# Patient Record
Sex: MALE | Race: BLACK OR AFRICAN AMERICAN | NOT HISPANIC OR LATINO | URBAN - METROPOLITAN AREA
[De-identification: names, ages, dates, MRNs, and addresses within clinical notes are randomized per-mention and may not be internally consistent; named-entity substitution may affect disease eponyms.]

---
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
## 2025-01-10 ENCOUNTER — RX RENEWAL (OUTPATIENT)
Age: 20
End: 2025-01-10

## 2025-01-15 ENCOUNTER — NON-APPOINTMENT (OUTPATIENT)
Age: 20
End: 2025-01-15

## 2025-01-23 NOTE — ASU DISCHARGE PLAN (ADULT/PEDIATRIC) - PROCEDURE
sedated MRI of the brain Recommendations (Free Text): HC1% cream Render Risk Assessment In Note?: no Detail Level: Zone Recommendation Preamble: The following recommendations were made during the visit:

## 2025-02-03 ENCOUNTER — RX RENEWAL (OUTPATIENT)
Age: 20
End: 2025-02-03

## 2025-02-06 ENCOUNTER — APPOINTMENT (OUTPATIENT)
Dept: PEDIATRIC NEUROLOGY | Facility: CLINIC | Age: 20
End: 2025-02-06
Payer: MEDICAID

## 2025-02-06 VITALS — HEIGHT: 71 IN | BODY MASS INDEX: 16.8 KG/M2 | WEIGHT: 120 LBS

## 2025-02-06 DIAGNOSIS — G40.909 EPILEPSY, UNSPECIFIED, NOT INTRACTABLE, W/OUT STATUS EPILEPTICUS: ICD-10-CM

## 2025-02-06 PROCEDURE — 99214 OFFICE O/P EST MOD 30 MIN: CPT

## 2025-02-06 PROCEDURE — G2211 COMPLEX E/M VISIT ADD ON: CPT | Mod: NC

## 2025-02-07 LAB — 24R-OH-CALCIDIOL SERPL-MCNC: 82.5 PG/ML

## 2025-02-11 LAB — LEVETIRACETAM SERPL-MCNC: 58.1 UG/ML

## 2025-02-12 ENCOUNTER — NON-APPOINTMENT (OUTPATIENT)
Age: 20
End: 2025-02-12

## 2025-02-12 LAB
25(OH)D3 SERPL-MCNC: 28.4 NG/ML
LACOSAMIDE (VIMPAT): 14.3 UG/ML

## 2025-03-03 ENCOUNTER — RX RENEWAL (OUTPATIENT)
Age: 20
End: 2025-03-03

## 2025-03-03 NOTE — ED PROVIDER NOTE - NECK, MLM
Caller: patient    Doctor: Dr. Edgar    Reason for call: patient called back returning a call from N. Patient was warm transferred    Call back#: n/a   normal

## 2025-06-12 NOTE — ED PROVIDER NOTE - CROS ED ENMT ALL NEG
[facility] Medicine Services  DISCHARGE SUMMARY    Patient Name: Barbara Phillips  : 1975  MRN: [id number]    Date of Admission: 2025  7:09 AM  Date of Discharge:  2025  Primary Care Physician: Matilda Almendarez MD    Consults       Date and Time Order Name Status Description    6/10/2025  8:46 AM Inpatient Neurology Consult General Completed             Hospital Course     Presenting Problem:     Active Hospital Problems    Diagnosis  POA    **Intractable vomiting with nausea [R11.2]  Yes    Hypertensive emergency [I16.1]  Yes    HTN (hypertension) [I10]  Yes    Rheumatoid arthritis [M06.9]  Yes    Hypertensive crisis [I16.9]  Yes      Resolved Hospital Problems   No resolved problems to display.          Hospital Course:  Barbara Phillips is a 49 y.o. female  w/ HTN, hypothyroid, rheumatoid/psoriatic arthritis, morbid obesity, holding her MTX for planned TKA and developed visual hallucinations ~4 days pta, then the night before arrival developed intractable N/V followed by a headache and was found to have BP of 237/139.     HTN Emergency  Abnl MRI likely c/w subacute CVA  Headache  Visual hallucinations  - d/w neurology - feels mri finding more c/w cva given risk factors.   - increased diovan.  BP still up, increased amlodipine to 10mg daily.  Home if SBP less than 170 and echo is normal  - Asa/statin  - s/p LP.  Neuro to f/u MS profile and oligoclonal bands     Mesenteric Panniculitis  -- noted on CT A/P, however, no other s/s of active infection. Continue to monitor off ABX for now      Hypothyroidism   - cont home levothyroxine     RA (reports rheumatoid/psoriatic arthritis)   - typically takes MTX on , last dose 25 in plan for surgery     Morbid Obesity  -- complicates all aspects of care      Discharge Follow Up Recommendations for outpatient labs/diagnostics:  F/u with PCP in 1 week  F/u with neuro, MINDI Luis in 1-2 mos, f/u MS profile and  oligoclonal bands    Day of Discharge     HPI:   Saw patient.  Says symptoms have resolved.  Wants to go home.     Review of Systems  Gen- No fevers, chills  CV- No chest pain, palpitations  Resp- No cough, dyspnea  GI- No N/V/D, abd pain      Vital Signs:   Temp:  [98 °F (36.7 °C)-99.2 °F (37.3 °C)] 98.8 °F (37.1 °C)  Heart Rate:  [] 99  Resp:  [16-19] 18  BP: (147-181)/() 174/91  Flow (L/min) (Oxygen Therapy):  [2-3] 3      Physical Exam:  Constitutional: No acute distress, awake, alert, sitting up in chair  HENT: NCAT, mucous membranes moist  Respiratory: Clear to auscultation bilaterally, respiratory effort normal   Cardiovascular: RRR, no murmurs, rubs, or gallops  Gastrointestinal: Positive bowel sounds, soft, nontender, nondistended  Musculoskeletal: No bilateral ankle edema  Psychiatric: Appropriate affect, cooperative  Neurologic: Oriented x 3, [place], speech clear  Skin: No rashes      Pertinent  and/or Most Recent Results     LAB RESULTS:      Lab 06/11/25  0445 06/10/25  0447 06/09/25  1044 06/09/25  0917 06/09/25  0747   WBC 8.75 9.47  --   --  10.40   HEMOGLOBIN 11.9* 12.3  --   --  14.2   HEMATOCRIT 36.2 37.8  --   --  41.1   PLATELETS 429 403  --   --  497*   NEUTROS ABS 4.09 5.00  --   --  7.21*   IMMATURE GRANS (ABS) 0.03 0.02  --   --  0.03   LYMPHS ABS 2.99 2.88  --   --  2.13   MONOS ABS 0.81 0.93*  --   --  0.71   EOS ABS 0.76* 0.57*  --   --  0.27   MCV 91.0 91.5  --   --  85.6   SED RATE 26*  --   --   --  53*   CRP  --   --   --  1.50*  --    PROCALCITONIN  --   --   --   --  0.08   LACTATE  --   --  1.8  --  3.2*         Lab 06/11/25  1533 06/11/25  0445 06/10/25  1157 06/09/25  0747   SODIUM  --  140 142 141   POTASSIUM 4.1 3.6 3.8 4.5   CHLORIDE  --  107 107 103   CO2  --  22.0 23.0 18.0*   ANION GAP  --  11.0 12.0 20.0*   BUN  --  5.7* 6.2 13.6   CREATININE  --  0.65 0.69 0.97   EGFR  --  108.1 106.5 71.8   GLUCOSE  --  124* 116* 153*   CALCIUM  --  8.0* 8.3* 10.0   IONIZED  CALCIUM  --   --   --  1.11*   MAGNESIUM  --   --  1.7 1.4*   HEMOGLOBIN A1C  --  6.30*  --   --    TSH  --  10.810*  --  9.890*         Lab 06/09/25  0747   TOTAL PROTEIN 8.5   ALBUMIN 4.7   GLOBULIN 3.8   ALT (SGPT) 11   AST (SGOT) 23   BILIRUBIN 0.4   ALK PHOS 112   LIPASE 13         Lab 06/09/25  0917 06/09/25  0747   PROBNP  --  1,104.0*   HSTROP T 25* 29*         Lab 06/11/25  0445   CHOLESTEROL 276*   LDL CHOL 189*   HDL CHOL 32*   TRIGLYCERIDES 279*             Brief Urine Lab Results  (Last result in the past 365 days)        Color   Clarity   Blood   Leuk Est   Nitrite   Protein   CREAT   Urine HCG        06/09/25 0738               Negative       06/09/25 0738 Yellow   Clear   Negative   Negative   Negative   30 mg/dL (1+)                 Microbiology Results (last 10 days)       Procedure Component Value - Date/Time    Meningitis / Encephalitis Panel, PCR - Cerebrospinal Fluid, Lumbar Puncture [[id number]]  (Normal) Collected: 06/11/25 1440    Lab Status: Final result Specimen: Cerebrospinal Fluid from Lumbar Puncture Updated: 06/11/25 1653     ESCHERICHIA COLI K1, PCR Not Detected     HAEMOPHILUS INFLUENZAE, PCR Not Detected     LISTERIA MONOCYTOGENES, PCR Not Detected     NEISSERIA MENINGITIDIS, PCR Not Detected     STREPTOCOCCUS AGALACTIAE, PCR Not Detected     STREPTOCOCCUS PNEUMONIAE, PCR Not Detected     CYTOMEGALOVIRUS (CMV), PCR Not Detected     ENTEROVIRUS, PCR Not Detected     HERPES SIMPLEX VIRUS 1 (HSV-1), PCR Not Detected     HERPES SIMPLEX VIRUS 2 (HSV-2), PCR Not Detected     HUMAN PARECHOVIRUS, PCR Not Detected     VARICELLA ZOSTER VIRUS (VZV), PCR Not Detected     CRYPTOCOCCUS NEOFORMANS / GATTII, PCR Not Detected     HUMAN HERPES VIRUS 6 PCR Not Detected    Blood Culture - Blood, Arm, Right [[id number]]  (Normal) Collected: 06/09/25 0917    Lab Status: Preliminary result Specimen: Blood from Arm, Right Updated: 06/12/25 1000     Blood Culture No growth at 3 days    Narrative:      Less  than seven (7) mL's of blood was collected.  Insufficient quantity may yield false negative results.    Blood Culture - Blood, Hand, Left [[id number]]  (Normal) Collected: 06/09/25 0900    Lab Status: Preliminary result Specimen: Blood from Hand, Left Updated: 06/12/25 1015     Blood Culture No growth at 3 days    Narrative:      Aerobic Bottle Only    Less than seven (7) mL's of blood was collected.  Insufficient quantity may yield false negative results.            CT Angiogram Head  Result Date: 6/11/2025  CT ANGIOGRAM HEAD, CT ANGIOGRAM NECK Date of Exam: 6/11/2025 3:03 PM EDT Indication: stroke. Comparison: Brain MRI from today Technique: CTA of the head was performed after the uneventful intravenous administration of 75 mL Isovue-370. Reconstructed coronal and sagittal images were also obtained. In addition, a 3-D volume rendered image was created for interpretation. Automated  exposure control and iterative reconstruction methods were used. FINDINGS: Vascular Findings: The right common carotid, internal carotid, middle cerebral, anterior cerebral, vertebral, and posterior cerebral arteries are patent without abrupt cut off or aneurysmal dilation. The left common carotid, internal carotid, middle cerebral, anterior cerebral, vertebral, and posterior cerebral arteries are patent without abrupt cut off or aneurysmal dilation. Basilar artery appears patent and appears unremarkable. Non-vascular Findings: For description of nonvascular intracranial findings, please refer to the brain MRI performed the same date. No acute abnormality is identified within the visualized soft tissue or bony structures of the neck. The visualized lung apices are clear.     1.No acute abnormality is identified within the large arteries of the head or neck. 2.No significant stenosis of the bilateral internal carotid arteries. Electronically Signed: Dilshad North MD  6/11/2025 4:30 PM EDT  Workstation ID: [id number]    CT Angiogram  Neck  Result Date: 6/11/2025  CT ANGIOGRAM HEAD, CT ANGIOGRAM NECK Date of Exam: 6/11/2025 3:03 PM EDT Indication: stroke. Comparison: Brain MRI from today Technique: CTA of the head was performed after the uneventful intravenous administration of 75 mL Isovue-370. Reconstructed coronal and sagittal images were also obtained. In addition, a 3-D volume rendered image was created for interpretation. Automated  exposure control and iterative reconstruction methods were used. FINDINGS: Vascular Findings: The right common carotid, internal carotid, middle cerebral, anterior cerebral, vertebral, and posterior cerebral arteries are patent without abrupt cut off or aneurysmal dilation. The left common carotid, internal carotid, middle cerebral, anterior cerebral, vertebral, and posterior cerebral arteries are patent without abrupt cut off or aneurysmal dilation. Basilar artery appears patent and appears unremarkable. Non-vascular Findings: For description of nonvascular intracranial findings, please refer to the brain MRI performed the same date. No acute abnormality is identified within the visualized soft tissue or bony structures of the neck. The visualized lung apices are clear.     1.No acute abnormality is identified within the large arteries of the head or neck. 2.No significant stenosis of the bilateral internal carotid arteries. Electronically Signed: Dilshad North MD  6/11/2025 4:30 PM EDT  Workstation ID: [id number]    IR LUMBAR PUNCTURE DIAGNOSTIC  Result Date: 6/11/2025  IR LUMBAR PUNCTURE DIAGNOSTIC Date of Exam: 6/11/2025 1:30 PM EDT Indication: MS workup.   Comparison: CT abdomen pelvis 6/9/2025. Technique:  Procedure, risks, complications, and side effects of lumbar puncture were discussed and reviewed in detail with the patient including the possibility for bleeding, infection, needle damage to surrounding structures, and the potential for a spinal headache. The patient indicated understanding and  consented to the procedure.  The patient was placed in the prone position on the table. The back was prepped and draped in a sterile fashion. 1% lidocaine was used as local anesthetic to the skin and subcutaneous tissues. Under fluoroscopic guidance a 22 g spinal needle was advanced  at the L4-L5 level to the CSF space. Initial sample was blood tinged, though subsequent fluid became clear and colorless. Approximately 6 cc of CSF was returned and collected in each of 4 numbered vials. Sample vials were labeled and sent to pathology for further analysis. The needle was removed. Fluoroscopic Time: 0.2 minutes Number of Images: 1 Findings/    Impression: Technically successful fluoroscopic-guided lumbar puncture, as above. Electronically Signed: Deep Tiwari MD  6/11/2025 3:59 PM EDT  Workstation ID: [id number]    MRI Cervical Spine With & Without Contrast  Result Date: 6/11/2025  MRI CERVICAL SPINE W WO CONTRAST Date of Exam: 6/11/2025 12:03 AM EDT Indication: MS eval.  Comparison: None available. Technique:  Routine multiplanar/multisequence sequence images of the cervical spine were obtained before and after the uneventful administration of 20 mL Multihance.  Findings: Seven cervical vertebrae are identified. Alignment is anatomic. Disc spaces maintain normal height. Vertebral bodies maintain normal height.  There is no focal bone marrow edema. There is no evidence of a marrow replacing process. No acute osseous abnormalities. Spinal cord signal is normal. Paraspinal musculature is grossly preserved. There is no evidence of cervical lymphadenopathy or a neck mass. The craniocervical junction appears within normal limits. Limited view of the posterior fossa contents is unremarkable. No abnormal enhancement. C2-C3: The posterior disc is unremarkable. The uncovertebral joints appear within normal limits. Mild left facet hypertrophy. No significant neural foraminal or spinal canal stenosis. C3-C4: The posterior disc is  negative - no nasal congestion unremarkable. The uncovertebral joints appear within normal limits. Mild left facet hypertrophy. No significant neural foraminal or spinal canal stenosis. C4-C5: The posterior disc is unremarkable. The uncovertebral joints appear within normal limits. No significant facet arthropathy. No significant neural foraminal or spinal canal stenosis. C5-C6: The posterior disc is unremarkable. The uncovertebral joints appear within normal limits. No significant facet arthropathy. No significant neural foraminal or spinal canal stenosis. C6-C7: The posterior disc is unremarkable. The uncovertebral joints appear within normal limits. No significant facet arthropathy. No significant neural foraminal or spinal canal stenosis. C7-T1: The posterior disc is unremarkable. No significant facet arthropathy. No significant neural foraminal or spinal canal stenosis.     Impression: 1.No evidence of demyelinating disease in the cervical spine. 2.Mild left-sided facet arthritis. Electronically Signed: Tutu Phillips MD  6/11/2025 3:38 AM EDT  Workstation ID: [id number]    MRI Brain With & Without Contrast  Result Date: 6/11/2025  MRI BRAIN W WO CONTRAST Date of Exam: 6/11/2025 12:03 AM EDT Indication: follow up contrasted portion of possible demyelination seen on noncontrasted MRI. MS protocol with saggital FLAIR.  Comparison: None available. Technique:  Routine multiplanar/multisequence sequence images of the brain were obtained before and after the uneventful administration of 20 mL Multihance. Findings: There is a small ovoid focus of relative diffusion restriction again noted in the left frontoparietal white matter. It measures approximately 8 mm in diameter. There is subtle enhancement. This may relate to a focus of demyelination or subacute lacunar infarct. There are numerous scattered FLAIR hyperintensities within the cerebral white matter. This is predominantly periventricular, but there are numerous subcortical foci, as well. There  is no evidence of an acute infarct. The pattern of white matter disease could be from demyelinating disease although chronic small vessel ischemic change is certainly possible. Cerebral cortex appears intact. There is a small focus of FLAIR hyperintense signal within the left putamen. Cerebellum and brainstem appear unremarkable. Major arterial flow voids appear intact. There is severe left and mild right maxillary sinus mucosal disease. Mastoid air cells appear well aerated. Orbits are symmetric. Optic nerves are normal. The midline structures appear intact. Calvarial and superficial soft tissue signal is within normal limits.     Impression: 1.Small focus of relative diffusion restriction in the left frontoparietal white matter with subtle enhancement. This could relate to a focus of demyelination or subacute lacunar infarct. 2.Numerous scattered FLAIR hyperintensities in the cerebral white matter. This could be from demyelinating disease or chronic small vessel ischemic change. 3.Severe left and mild right maxillary sinus mucosal disease. Electronically Signed: Tutu Phillips MD  6/11/2025 3:21 AM EDT  Workstation ID: [id number]    MRI Brain Without Contrast  Result Date: 6/9/2025  MRI BRAIN WO CONTRAST Date of Exam: 6/9/2025 8:50 PM EDT Indication: Visual hallucinations, SBP >200 mmHg, vomiting, r/o PRES.  Comparison: CT of the head performed on the same date. Technique:  Routine multiplanar/multisequence sequence images of the brain were obtained without contrast administration. Findings: No area of restricted diffusion is identified to suggest an acute intracranial infarct. Multifocal areas of nonspecific increased T2/FLAIR signal predominantly within the periventricular white matter. The ventricles and sulci are normal in size  and configuration. No intracranial mass or mass effect. No extra-axial mass or collection. The posterior fossa is normal. Sellar and suprasellar structures are normal. The major  intracranial flow-voids of the [place] are patent. Orbital and periorbital soft tissues are normal. Severe mucosal thickening of the left maxillary sinus. Mucoid retention cysts or polyps within the right maxillary sinus.     Impression: White matter changes likely demyelinating disease with differential considerations including sequela prior inflammation or infection, vasculitis, or chronic small vessel/microangiopathic ischemic changes Electronically Signed: Kyle Hurt MD  6/9/2025 9:20 PM EDT  Workstation ID: [id number]    CT Angiogram Chest  Result Date: 6/9/2025  CT ANGIOGRAM CHEST, CT ABDOMEN PELVIS W CONTRAST Date of Exam: 6/9/2025 11:00 AM EDT Indication: Hallucinations, tachycardia, ill. Comparison: None available. Technique: CTA of the chest was performed after the uneventful intravenous administration of 85 mL Isovue-370. Reconstructed coronal and sagittal images were also obtained. In addition, a 3-D volume rendered image was created for interpretation. Automated exposure control and iterative reconstruction methods were used. FINDINGS: Thoracic inlet: Unremarkable. Pulmonary arteries: Suboptimal contrast bolus timing limit evaluation of the small peripheral pulmonary arteries. No large central pulmonary embolism is seen. Great vessels: The thoracic aorta and proximal arch vessels appear unremarkable. Mediastinum/Ivy: No pathologically enlarged mediastinal lymph nodes are seen. The esophagus is unremarkable. Lung parenchyma: The lungs appear adequately aerated. No acute consolidation is seen. No suspicious pulmonary nodules are seen. Trachea and airways: The trachea and central airways appear unremarkable. Pleural space: No significant pleural effusion or pneumothorax is seen. Heart and pericardium: The heart and pericardium appear unremarkable. Liver: The liver is prominent in size and decreased in attenuation. No focal liver lesion is seen. No biliary dilation is seen. Gallbladder: Surgically  absent. Pancreas: Unremarkable. Spleen: Unremarkable. Adrenal glands: Unremarkable. Genitourinary tract: Peripelvic cysts noted within the left kidney. Kidneys are otherwise unremarkable. No hydronephrosis is seen. The visualized portions of the ureters and urinary bladder appear unremarkable. 5 cm mass associated with the right uterus,  likely a fibroid. Gastrointestinal tract: A few scattered colonic diverticula are seen. The hollow viscera appear otherwise unremarkable. There is no evidence of bowel obstruction. Appendix: The appendix is not identified. Other findings: Stranding within the small bowel mesenteric fat, nonspecific, possibly related to mesenteric panniculitis. No free air or free fluid is identified. No pathologically enlarged lymph nodes are seen. The abdominal aorta and IVC appear unremarkable. Bones and soft tissues: No acute or suspicious osseous or soft tissue lesion is identified.     1.Suboptimal contrast bolus timing limits evaluation of the small peripheral pulmonary arteries. No large central pulmonary embolism is seen. 2.No acute abnormality is identified within the chest, abdomen, or pelvis. 3.Enlarged, fatty liver. 4.Stranding within the small bowel mesenteric fat, nonspecific, possibly related to mesenteric panniculitis. 5.Probable 5 cm fibroid within the right uterus. 6.Additional findings as detailed above. Electronically Signed: Dilshad North MD  6/9/2025 11:36 AM EDT  Workstation ID: [id number]    CT Abdomen Pelvis With Contrast  Result Date: 6/9/2025  CT ANGIOGRAM CHEST, CT ABDOMEN PELVIS W CONTRAST Date of Exam: 6/9/2025 11:00 AM EDT Indication: Hallucinations, tachycardia, ill. Comparison: None available. Technique: CTA of the chest was performed after the uneventful intravenous administration of 85 mL Isovue-370. Reconstructed coronal and sagittal images were also obtained. In addition, a 3-D volume rendered image was created for interpretation. Automated exposure control and  iterative reconstruction methods were used. FINDINGS: Thoracic inlet: Unremarkable. Pulmonary arteries: Suboptimal contrast bolus timing limit evaluation of the small peripheral pulmonary arteries. No large central pulmonary embolism is seen. Great vessels: The thoracic aorta and proximal arch vessels appear unremarkable. Mediastinum/Ivy: No pathologically enlarged mediastinal lymph nodes are seen. The esophagus is unremarkable. Lung parenchyma: The lungs appear adequately aerated. No acute consolidation is seen. No suspicious pulmonary nodules are seen. Trachea and airways: The trachea and central airways appear unremarkable. Pleural space: No significant pleural effusion or pneumothorax is seen. Heart and pericardium: The heart and pericardium appear unremarkable. Liver: The liver is prominent in size and decreased in attenuation. No focal liver lesion is seen. No biliary dilation is seen. Gallbladder: Surgically absent. Pancreas: Unremarkable. Spleen: Unremarkable. Adrenal glands: Unremarkable. Genitourinary tract: Peripelvic cysts noted within the left kidney. Kidneys are otherwise unremarkable. No hydronephrosis is seen. The visualized portions of the ureters and urinary bladder appear unremarkable. 5 cm mass associated with the right uterus,  likely a fibroid. Gastrointestinal tract: A few scattered colonic diverticula are seen. The hollow viscera appear otherwise unremarkable. There is no evidence of bowel obstruction. Appendix: The appendix is not identified. Other findings: Stranding within the small bowel mesenteric fat, nonspecific, possibly related to mesenteric panniculitis. No free air or free fluid is identified. No pathologically enlarged lymph nodes are seen. The abdominal aorta and IVC appear unremarkable. Bones and soft tissues: No acute or suspicious osseous or soft tissue lesion is identified.     1.Suboptimal contrast bolus timing limits evaluation of the small peripheral pulmonary arteries.  No large central pulmonary embolism is seen. 2.No acute abnormality is identified within the chest, abdomen, or pelvis. 3.Enlarged, fatty liver. 4.Stranding within the small bowel mesenteric fat, nonspecific, possibly related to mesenteric panniculitis. 5.Probable 5 cm fibroid within the right uterus. 6.Additional findings as detailed above. Electronically Signed: Dilshad North MD  6/9/2025 11:36 AM EDT  Workstation ID: [id number]    XR Chest 1 View  Result Date: 6/9/2025  XR CHEST 1 VW Date of Exam: 6/9/2025 9:19 AM EDT Indication: elevated BNP Comparison: None available. Findings: Cardiomediastinal silhouette is unremarkable.  No airspace disease, pneumothorax, nor pleural effusion. No acute osseous abnormality identified.     Impression: No acute cardiopulmonary process. Electronically Signed: Dale Combs MD  6/9/2025 9:41 AM EDT  Workstation ID: [id number]    CT Head Without Contrast  Result Date: 6/9/2025  CT HEAD WO CONTRAST Date of Exam: 6/9/2025 8:40 AM EDT Indication: ams. Comparison: None available. Technique: Axial CT images were obtained of the head without contrast administration.  Automated exposure control and iterative construction methods were used. Findings: No acute intracranial hemorrhage or extra-axial collection is identified. The ventricles appear normal in caliber, with no evidence of mass effect or midline shift. The basal cisterns appear patent. The gray-white differentiation appears preserved. The calvarium appear intact. There is moderate frothy paranasal sinus mucosal disease. The mastoid air cells are well-aerated.     Impression: 1.No acute intracranial process identified. 2.Moderate frothy paranasal sinus mucosal disease. Correlate for acute sinusitis. Electronically Signed: Sohail Reyes MD  6/9/2025 9:06 AM EDT  Workstation ID: [id number]                  Plan for Follow-up of Pending Labs/Results:   Pending Labs       Order Current Status    MS Profile & MBP, CSF and Serum -  Cerebrospinal Fluid, Lumbar Puncture In process    Blood Culture - Blood, Arm, Right Preliminary result    Blood Culture - Blood, Hand, Left Preliminary result          Discharge Details        Discharge Medications        New Medications        Instructions Start Date   amLODIPine 10 MG tablet  Commonly known as: NORVASC   10 mg, Oral, Every 24 Hours Scheduled   Start Date: June 13, 2025     aspirin 81 MG EC tablet   81 mg, Oral, Daily      atorvastatin 80 MG tablet  Commonly known as: LIPITOR   80 mg, Oral, Nightly             Changes to Medications        Instructions Start Date   valsartan 320 MG tablet  Commonly known as: DIOVAN  What changed:   medication strength  how much to take   320 mg, Oral, Daily   Start Date: June 13, 2025            Continue These Medications        Instructions Start Date   levothyroxine 75 MCG tablet  Commonly known as: Synthroid   75 mcg, Oral, Every Early Morning      methotrexate 2.5 MG tablet   2.5 mg, Weekly      vitamin D 1.25 MG (98506 UT) capsule capsule  Commonly known as: ERGOCALCIFEROL   50,000 Units, Weekly             Stop These Medications      celecoxib 100 MG capsule  Commonly known as: CeleBREX              Allergies   Allergen Reactions    Morphine And Codeine          Discharge Disposition:  Home or Self Care    Diet:  Hospital:  Diet Order   Procedures    Diet: Regular/House; Fluid Consistency: Thin (IDDSI 0)            Activity:      Restrictions or Other Recommendations:         CODE STATUS:    Code Status and Medical Interventions: CPR (Attempt to Resuscitate); Full Support   Ordered at: 06/09/25 1400     Code Status (Patient has no pulse and is not breathing):    CPR (Attempt to Resuscitate)     Medical Interventions (Patient has pulse or is breathing):    Full Support       No future appointments.    Additional Instructions for the Follow-ups that You Need to Schedule       Discharge Follow-up with PCP   As directed       Currently Documented PCP:     Matilda Almendarez MD    PCP Phone Number:    [phone]     Follow Up Details: with PCP in 1 weeks        Discharge Follow-up with Specified Provider: chacha/JARVIS plascencia APRN; 1 Month   As directed      To: f/eva peoples, MINDI Fajardo   Follow Up: 1 Month   Follow Up Details: 1-2 mos.  f/u MS profile and oligoclonal beds                      Yazan Summers MD  06/12/25      Time Spent on Discharge:  I spent  39  minutes on this discharge activity which included: face-to-face encounter with the patient, reviewing the data in the system, coordination of the care with the nursing staff as well as consultants, documentation, and entering orders.

## 2025-08-07 ENCOUNTER — RX RENEWAL (OUTPATIENT)
Age: 20
End: 2025-08-07

## 2025-09-11 ENCOUNTER — NON-APPOINTMENT (OUTPATIENT)
Age: 20
End: 2025-09-11